# Patient Record
Sex: FEMALE | URBAN - METROPOLITAN AREA
[De-identification: names, ages, dates, MRNs, and addresses within clinical notes are randomized per-mention and may not be internally consistent; named-entity substitution may affect disease eponyms.]

---

## 2023-07-06 ENCOUNTER — OFFICE VISIT (OUTPATIENT)
Dept: URBAN - METROPOLITAN AREA CLINIC 50 | Facility: CLINIC | Age: 78
End: 2023-07-06
Payer: MEDICARE

## 2023-07-06 DIAGNOSIS — H52.4 PRESBYOPIA: ICD-10-CM

## 2023-07-06 DIAGNOSIS — I10 ESSENTIAL (PRIMARY) HYPERTENSION: Primary | ICD-10-CM

## 2023-07-06 DIAGNOSIS — H25.23 AGE-RELATED CATARACT, MORGANIAN TYPE, BILATERAL: ICD-10-CM

## 2023-07-06 DIAGNOSIS — H16.223 KERATOCONJUNCTIVITIS SICCA, BILATERAL: ICD-10-CM

## 2023-07-06 PROCEDURE — 99204 OFFICE O/P NEW MOD 45 MIN: CPT | Performed by: OPTOMETRIST

## 2023-07-06 ASSESSMENT — KERATOMETRY
OS: 43.52
OD: 43.19

## 2023-07-06 ASSESSMENT — INTRAOCULAR PRESSURE
OS: 12
OD: 14

## 2023-07-06 ASSESSMENT — VISUAL ACUITY
OS: 20/50
OD: 20/30

## 2023-07-06 NOTE — IMPRESSION/PLAN
Impression: Keratoconjunctivitis sicca, bilateral: Y56.276. Plan: Dry eyes account for the patient's complaints. There is no evidence of permanent changes to the cornea. Explained condition does not have a cure and will need artificial tears for maintenance, recommended to use 4-6 times a day.

## 2023-07-06 NOTE — IMPRESSION/PLAN
Impression: Age-related cataract, morganian type, bilateral: H25.23. Plan: Cataracts account for the patient's complaints. Refraction performed and per patients request wants RX finalized. BCVA did not improve patient to 20/40 or better in the left eye. Patient understands changing glasses will not improve vision. Patient does not desires to have cataract surgery at the moment.